# Patient Record
Sex: MALE | Race: BLACK OR AFRICAN AMERICAN | URBAN - METROPOLITAN AREA
[De-identification: names, ages, dates, MRNs, and addresses within clinical notes are randomized per-mention and may not be internally consistent; named-entity substitution may affect disease eponyms.]

---

## 2022-03-31 ENCOUNTER — WEB ENCOUNTER (OUTPATIENT)
Dept: URBAN - METROPOLITAN AREA CLINIC 82 | Facility: CLINIC | Age: 22
End: 2022-03-31

## 2022-03-31 ENCOUNTER — CLAIMS CREATED FROM THE CLAIM WINDOW (OUTPATIENT)
Dept: URBAN - METROPOLITAN AREA CLINIC 82 | Facility: CLINIC | Age: 22
End: 2022-03-31
Payer: COMMERCIAL

## 2022-03-31 ENCOUNTER — DASHBOARD ENCOUNTERS (OUTPATIENT)
Age: 22
End: 2022-03-31

## 2022-03-31 VITALS
WEIGHT: 177.4 LBS | BODY MASS INDEX: 24.03 KG/M2 | HEART RATE: 60 BPM | TEMPERATURE: 98.3 F | HEIGHT: 72 IN | DIASTOLIC BLOOD PRESSURE: 69 MMHG | SYSTOLIC BLOOD PRESSURE: 114 MMHG

## 2022-03-31 DIAGNOSIS — R76.8 HEPATITIS B CORE ANTIBODY POSITIVE: ICD-10-CM

## 2022-03-31 DIAGNOSIS — R79.89 ABNORMAL PEAK TOTAL BILIRUBIN LEVEL: ICD-10-CM

## 2022-03-31 PROBLEM — 26165005: Status: ACTIVE | Noted: 2022-03-31

## 2022-03-31 PROBLEM — 736687002: Status: ACTIVE | Noted: 2022-03-31

## 2022-03-31 PROCEDURE — 99243 OFF/OP CNSLTJ NEW/EST LOW 30: CPT | Performed by: INTERNAL MEDICINE

## 2022-03-31 PROCEDURE — 99203 OFFICE O/P NEW LOW 30 MIN: CPT | Performed by: INTERNAL MEDICINE

## 2022-03-31 NOTE — HPI-TODAY'S VISIT:
The patient was referred by Dr. Gustavo Felder for abnormal hepatitis panel and total bilirubin .   A copy of this document is being forwarded to the referring provider.  20 y/o Black man with no significant medical hx that came with his mother given + core antibodies for hep b. surface antigen was negative means prior exposure to the virus with natural immunity,no suggest chronic hepatitis B. Elevated total bilirubin with indirect portion higher than direct consisten with Gilbert.Denies IV durg use,alcohol abuse.

## 2022-04-02 LAB
ALBUMIN: 4.3
ALKALINE PHOSPHATASE: 108
ALT (SGPT): 23
AST (SGOT): 32
BILIRUBIN, DIRECT: 0.29
BILIRUBIN, TOTAL: 1.3
HBSAG SCREEN: NEGATIVE
HBV IU/ML: (no result)
HEPATITIS B SURF AB QUANT: 226.6
LOG10 HBV IU/ML: (no result)
PROTEIN, TOTAL: 6.7
TEST INFORMATION:: (no result)

## 2025-08-27 ENCOUNTER — LAB OUTSIDE AN ENCOUNTER (OUTPATIENT)
Dept: URBAN - METROPOLITAN AREA CLINIC 82 | Facility: CLINIC | Age: 25
End: 2025-08-27

## 2025-08-27 ENCOUNTER — OFFICE VISIT (OUTPATIENT)
Dept: URBAN - METROPOLITAN AREA CLINIC 82 | Facility: CLINIC | Age: 25
End: 2025-08-27
Payer: COMMERCIAL

## 2025-08-27 DIAGNOSIS — R13.19 ESOPHAGEAL DYSPHAGIA: ICD-10-CM

## 2025-08-27 PROCEDURE — 99203 OFFICE O/P NEW LOW 30 MIN: CPT | Performed by: STUDENT IN AN ORGANIZED HEALTH CARE EDUCATION/TRAINING PROGRAM

## 2025-08-27 RX ORDER — PANTOPRAZOLE SODIUM 20 MG/1
1 TABLET 1/2 TO 1 HOUR BEFORE MORNING MEAL TABLET, DELAYED RELEASE ORAL ONCE A DAY
Qty: 30 | Status: ACTIVE | COMMUNITY